# Patient Record
Sex: MALE | Race: WHITE | HISPANIC OR LATINO | Employment: FULL TIME | URBAN - METROPOLITAN AREA
[De-identification: names, ages, dates, MRNs, and addresses within clinical notes are randomized per-mention and may not be internally consistent; named-entity substitution may affect disease eponyms.]

---

## 2021-01-26 ENCOUNTER — APPOINTMENT (OUTPATIENT)
Dept: RADIOLOGY | Facility: MEDICAL CENTER | Age: 31
End: 2021-01-26
Attending: EMERGENCY MEDICINE
Payer: COMMERCIAL

## 2021-01-26 ENCOUNTER — HOSPITAL ENCOUNTER (EMERGENCY)
Facility: MEDICAL CENTER | Age: 31
End: 2021-01-26
Attending: EMERGENCY MEDICINE
Payer: COMMERCIAL

## 2021-01-26 ENCOUNTER — NURSE TRIAGE (OUTPATIENT)
Dept: HEALTH INFORMATION MANAGEMENT | Facility: OTHER | Age: 31
End: 2021-01-26

## 2021-01-26 VITALS
OXYGEN SATURATION: 96 % | DIASTOLIC BLOOD PRESSURE: 67 MMHG | SYSTOLIC BLOOD PRESSURE: 101 MMHG | BODY MASS INDEX: 22.81 KG/M2 | RESPIRATION RATE: 18 BRPM | TEMPERATURE: 97.6 F | HEART RATE: 82 BPM | WEIGHT: 154 LBS | HEIGHT: 69 IN

## 2021-01-26 DIAGNOSIS — R07.81 PLEURITIC CHEST PAIN: ICD-10-CM

## 2021-01-26 DIAGNOSIS — U07.1 PNEUMONIA DUE TO COVID-19 VIRUS: ICD-10-CM

## 2021-01-26 DIAGNOSIS — R04.2 HEMOPTYSIS: ICD-10-CM

## 2021-01-26 DIAGNOSIS — J12.82 PNEUMONIA DUE TO COVID-19 VIRUS: ICD-10-CM

## 2021-01-26 LAB
ALBUMIN SERPL BCP-MCNC: 3.9 G/DL (ref 3.2–4.9)
ALBUMIN/GLOB SERPL: 1.2 G/DL
ALP SERPL-CCNC: 111 U/L (ref 30–99)
ALT SERPL-CCNC: 169 U/L (ref 2–50)
ANION GAP SERPL CALC-SCNC: 12 MMOL/L (ref 7–16)
AST SERPL-CCNC: 77 U/L (ref 12–45)
BASOPHILS # BLD AUTO: 0.1 % (ref 0–1.8)
BASOPHILS # BLD: 0.01 K/UL (ref 0–0.12)
BILIRUB SERPL-MCNC: 0.8 MG/DL (ref 0.1–1.5)
BUN SERPL-MCNC: 10 MG/DL (ref 8–22)
CALCIUM SERPL-MCNC: 9.2 MG/DL (ref 8.5–10.5)
CHLORIDE SERPL-SCNC: 104 MMOL/L (ref 96–112)
CO2 SERPL-SCNC: 22 MMOL/L (ref 20–33)
CREAT SERPL-MCNC: 1.08 MG/DL (ref 0.5–1.4)
EOSINOPHIL # BLD AUTO: 0.03 K/UL (ref 0–0.51)
EOSINOPHIL NFR BLD: 0.3 % (ref 0–6.9)
ERYTHROCYTE [DISTWIDTH] IN BLOOD BY AUTOMATED COUNT: 35.5 FL (ref 35.9–50)
GLOBULIN SER CALC-MCNC: 3.2 G/DL (ref 1.9–3.5)
GLUCOSE SERPL-MCNC: 95 MG/DL (ref 65–99)
HCT VFR BLD AUTO: 42.8 % (ref 42–52)
HGB BLD-MCNC: 15.2 G/DL (ref 14–18)
IMM GRANULOCYTES # BLD AUTO: 0.13 K/UL (ref 0–0.11)
IMM GRANULOCYTES NFR BLD AUTO: 1.2 % (ref 0–0.9)
LYMPHOCYTES # BLD AUTO: 1.15 K/UL (ref 1–4.8)
LYMPHOCYTES NFR BLD: 11 % (ref 22–41)
MCH RBC QN AUTO: 29.9 PG (ref 27–33)
MCHC RBC AUTO-ENTMCNC: 35.5 G/DL (ref 33.7–35.3)
MCV RBC AUTO: 84.3 FL (ref 81.4–97.8)
MONOCYTES # BLD AUTO: 0.89 K/UL (ref 0–0.85)
MONOCYTES NFR BLD AUTO: 8.5 % (ref 0–13.4)
NEUTROPHILS # BLD AUTO: 8.25 K/UL (ref 1.82–7.42)
NEUTROPHILS NFR BLD: 78.9 % (ref 44–72)
NRBC # BLD AUTO: 0 K/UL
NRBC BLD-RTO: 0 /100 WBC
PLATELET # BLD AUTO: 361 K/UL (ref 164–446)
PMV BLD AUTO: 9.7 FL (ref 9–12.9)
POTASSIUM SERPL-SCNC: 3.5 MMOL/L (ref 3.6–5.5)
PROCALCITONIN SERPL-MCNC: 0.06 NG/ML
PROT SERPL-MCNC: 7.1 G/DL (ref 6–8.2)
RBC # BLD AUTO: 5.08 M/UL (ref 4.7–6.1)
SODIUM SERPL-SCNC: 138 MMOL/L (ref 135–145)
WBC # BLD AUTO: 10.5 K/UL (ref 4.8–10.8)

## 2021-01-26 PROCEDURE — 36415 COLL VENOUS BLD VENIPUNCTURE: CPT | Mod: EDC

## 2021-01-26 PROCEDURE — 84145 PROCALCITONIN (PCT): CPT

## 2021-01-26 PROCEDURE — 85025 COMPLETE CBC W/AUTO DIFF WBC: CPT

## 2021-01-26 PROCEDURE — 80053 COMPREHEN METABOLIC PANEL: CPT

## 2021-01-26 PROCEDURE — 700117 HCHG RX CONTRAST REV CODE 255: Performed by: EMERGENCY MEDICINE

## 2021-01-26 PROCEDURE — 71275 CT ANGIOGRAPHY CHEST: CPT

## 2021-01-26 PROCEDURE — 99284 EMERGENCY DEPT VISIT MOD MDM: CPT | Mod: EDC

## 2021-01-26 RX ORDER — TRAMADOL HYDROCHLORIDE 50 MG/1
50-100 TABLET ORAL EVERY 6 HOURS PRN
Qty: 16 TAB | Refills: 0 | Status: SHIPPED | OUTPATIENT
Start: 2021-01-26 | End: 2021-01-31

## 2021-01-26 RX ADMIN — IOHEXOL 60 ML: 350 INJECTION, SOLUTION INTRAVENOUS at 13:03

## 2021-01-26 SDOH — HEALTH STABILITY: MENTAL HEALTH: HOW OFTEN DO YOU HAVE A DRINK CONTAINING ALCOHOL?: 2-4 TIMES A MONTH

## 2021-01-26 SDOH — HEALTH STABILITY: MENTAL HEALTH: HOW OFTEN DO YOU HAVE 6 OR MORE DRINKS ON ONE OCCASION?: NEVER

## 2021-01-26 SDOH — HEALTH STABILITY: MENTAL HEALTH: HOW MANY STANDARD DRINKS CONTAINING ALCOHOL DO YOU HAVE ON A TYPICAL DAY?: 1 OR 2

## 2021-01-26 NOTE — ED NOTES
1215 - RN rounded on pt who is waiting for CT scan. He denies any current needs.   1255 - Pt to CT.  1301 - Pt returned from CT now.

## 2021-01-26 NOTE — TELEPHONE ENCOUNTER
"Coughing up blood for 3 days.  Pain in LEFT mid back pain  Taking Tussin DM for cough     Hx of COVID 19  Symptom onset 01/10/21 with fever  Tested positive 01/12/21    No PCP, Advised to go to ED.      Reason for Disposition  • [1] Coughed up blood AND [2] > 1 tablespoon (15 ml) (Exception: blood-tinged sputum)    Additional Information  • Negative: Severe difficulty breathing (e.g., struggling for each breath, speaks in single words)  • Negative: [1] Chest pain AND [2] difficulty breathing  • Negative: Bluish (or gray) lips or face now  • Negative: Passed out (i.e., lost consciousness, collapsed and was not responding)  • Negative: Shock suspected (e.g., cold/pale/clammy skin, too weak to stand, low BP, rapid pulse)  • Negative: Difficult to awaken or acting confused (e.g., disoriented, slurred speech)  • Negative: Recent chest injury (i.e., past 24 hours)  • Negative: [1] Coughed up blood AND [2] large amount (example: \"a cup of blood\")  • Negative: Sounds like a life-threatening emergency to the triager  • Negative: Difficulty breathing  • Negative: Chest pain  • Negative: Unclear to triager if the patient is coughing up blood or vomiting blood  • Negative: History of prior \"blood clot\" in leg or lungs (i.e., deep vein thrombosis, pulmonary embolism)  • Negative: History of inherited increased risk of blood clots (e.g., Factor 5 Leiden, Anti-thrombin 3, Protein C or Protein S deficiency, Prothrombin mutation)  • Negative: Pregnant or pregnant in past month  • Negative: Hip or leg fracture in past 2 months (e.g., or had cast on leg or ankle)  • Negative: Prolonged travel within the last month  (e.g., long bus trip or plane trip)  • Negative: Bedridden (e.g., nursing home patient, CVA, chronic illness, recovering from surgery)  • Negative: Patient sounds very sick or weak to the triager    Answer Assessment - Initial Assessment Questions  1. ONSET: \"When did you start coughing up blood?\"      3 days  2. SEVERITY: " "\"How many times?\" \"How much blood?\" (e.g., flecks, streaks, tablespoons, etc)     10 times, really bloody  3. COUGHING SPASMS: \"Did the blood appear after a coughing spell?\"        No  4. RESPIRATORY DISTRESS: \"Describe your breathing.\"       Hard time breathing because of back of pain  5. FEVER: \"Do you have a fever?\" If so, ask: \"What is your temperature, how was it measured, and when did it start?\"      No  6. SPUTUM: \"Describe the color of your sputum\" (clear, white, yellow, green), \"Has there been any change recently?\"      Bloody  7. CARDIAC HISTORY: \"Do you have any history of heart disease?\" (e.g., heart attack, congestive heart failure)       No  8. LUNG HISTORY: \"Do you have any history of lung disease?\"  (e.g., pulmonary embolus, asthma, emphysema)      No  9. PE RISK FACTORS: \"Do you have a history of blood clots?\" (or: recent major surgery, recent prolonged travel, bedridden)     Tonsillectomy 4 years ago  10. OTHER SYMPTOMS: \"Do you have any other symptoms?\" (e.g., nosebleed, chest pain, abdominal pain, vomiting)        No  11. PREGNANCY: \"Is there any chance you are pregnant?\" \"When was your last menstrual period?\"        n/a  12. TRAVEL: \"Have you traveled out of the country in the last month?\" (e.g., travel history, exposures)        No    Protocols used: COUGHING UP BLOOD-A-AH      "

## 2021-01-26 NOTE — ED NOTES
VSS w/ in last 15 minutes. DC instructions, prescriptions for Ultram electronically sent, & FU care explained to pt who verbalized understanding. DC'd home.

## 2021-01-26 NOTE — ED TRIAGE NOTES
Chief Complaint   Patient presents with   • Blood in Sputum     States he has been coughing up bright red blood X 3 days.  Tested positive for covid-19 on 1-12-21.  covid symptoms began 1/10/21.  Triage process explained to patient.  Pt instructed to inform RN if any changes or questions arise.  Pt to senior lounge.

## 2021-01-26 NOTE — ED NOTES
Introduction to pt. History obtained. Pt assessment completed, gown to pt. Call light within reach, no additional needs at this time.

## 2021-01-26 NOTE — ED PROVIDER NOTES
ED Provider Note    CHIEF COMPLAINT  Chief Complaint   Patient presents with   • Blood in Sputum       HPI  Sixto Saucedo is a 30 y.o. male who presents with left posterior flank and thoracic pain worse with deep breath.  He states onset of pain earlier this morning, with associated worsening cough.  Patient coughed up bloody sputum twice at least.  He denies history of DVT or pulmonary embolism.  He was diagnosed with COVID-19 on January 12, symptoms have started on January 10.  Activity worsens cough.  No abdominal pain, no vomiting.  No bloody emesis or stool, no hematuria.  Patient does not take blood thinners.  Patient with persistent slight cough during the evaluation    REVIEW OF SYSTEMS    Constitutional: No fever or dizziness  Respiratory: Cough, shortness of breath, hemoptysis, pleuritic chest pain  Cardiac: No syncope  Gastrointestinal: No abdominal pain, no vomiting  Musculoskeletal: Left-sided thoracic pain  Neurologic: Denies headache       All other systems are negative.       PAST MEDICAL HISTORY  History reviewed. No pertinent past medical history.    FAMILY HISTORY  History reviewed. No pertinent family history.    SOCIAL HISTORY  Social History     Socioeconomic History   • Marital status: Not on file     Spouse name: Not on file   • Number of children: Not on file   • Years of education: Not on file   • Highest education level: Not on file   Occupational History   • Not on file   Social Needs   • Financial resource strain: Not on file   • Food insecurity     Worry: Not on file     Inability: Not on file   • Transportation needs     Medical: Not on file     Non-medical: Not on file   Tobacco Use   • Smoking status: Never Smoker   • Smokeless tobacco: Never Used   Substance and Sexual Activity   • Alcohol use: Yes     Frequency: 2-4 times a month     Drinks per session: 1 or 2     Binge frequency: Never   • Drug use: Never   • Sexual activity: Not on file   Lifestyle   • Physical activity     Days  "per week: Not on file     Minutes per session: Not on file   • Stress: Not on file   Relationships   • Social connections     Talks on phone: Not on file     Gets together: Not on file     Attends Yarsanism service: Not on file     Active member of club or organization: Not on file     Attends meetings of clubs or organizations: Not on file     Relationship status: Not on file   • Intimate partner violence     Fear of current or ex partner: Not on file     Emotionally abused: Not on file     Physically abused: Not on file     Forced sexual activity: Not on file   Other Topics Concern   • Not on file   Social History Narrative   • Not on file       SURGICAL HISTORY  History reviewed. No pertinent surgical history.    CURRENT MEDICATIONS  Home Medications     Reviewed by John Fernández R.N. (Registered Nurse) on 01/26/21 at 1025  Med List Status: Complete   Medication Last Dose Status        Patient Jigar Taking any Medications                       ALLERGIES  No Known Allergies    PHYSICAL EXAM  VITAL SIGNS: BP (!) 98/59   Pulse 99   Temp 36.3 °C (97.3 °F) (Temporal)   Resp 18   Ht 1.753 m (5' 9\")   Wt 69.9 kg (154 lb)   SpO2 96%   BMI 22.74 kg/m²   Constitutional:  Non-toxic appearance.   HENT: No facial swelling  Eyes: Anicteric, no conjunctivitis.     Cardiovascular: Normal heart rate, Normal rhythm  Pulmonary:  No wheezing with good air movement bilateral.  There are crackles at left lateral and left posterior lower thoracic region at site of pain.  Gastrointestinal: Soft, No tenderness, No masses  Skin: Warm, Dry, No cyanosis.  No bruising  Neurologic: Speech is clear, follows commands, facial expression is symmetrical.  Psychiatric: Affect normal,  Mood normal.  Patient is calm and cooperative  Musculoskeletal: Ribs are nontender.  No spinal tenderness.  No flank tenderness.    EKG/Labs  Results for orders placed or performed during the hospital encounter of 01/26/21   CBC WITH DIFFERENTIAL   Result " Value Ref Range    WBC 10.5 4.8 - 10.8 K/uL    RBC 5.08 4.70 - 6.10 M/uL    Hemoglobin 15.2 14.0 - 18.0 g/dL    Hematocrit 42.8 42.0 - 52.0 %    MCV 84.3 81.4 - 97.8 fL    MCH 29.9 27.0 - 33.0 pg    MCHC 35.5 (H) 33.7 - 35.3 g/dL    RDW 35.5 (L) 35.9 - 50.0 fL    Platelet Count 361 164 - 446 K/uL    MPV 9.7 9.0 - 12.9 fL    Neutrophils-Polys 78.90 (H) 44.00 - 72.00 %    Lymphocytes 11.00 (L) 22.00 - 41.00 %    Monocytes 8.50 0.00 - 13.40 %    Eosinophils 0.30 0.00 - 6.90 %    Basophils 0.10 0.00 - 1.80 %    Immature Granulocytes 1.20 (H) 0.00 - 0.90 %    Nucleated RBC 0.00 /100 WBC    Neutrophils (Absolute) 8.25 (H) 1.82 - 7.42 K/uL    Lymphs (Absolute) 1.15 1.00 - 4.80 K/uL    Monos (Absolute) 0.89 (H) 0.00 - 0.85 K/uL    Eos (Absolute) 0.03 0.00 - 0.51 K/uL    Baso (Absolute) 0.01 0.00 - 0.12 K/uL    Immature Granulocytes (abs) 0.13 (H) 0.00 - 0.11 K/uL    NRBC (Absolute) 0.00 K/uL   COMP METABOLIC PANEL   Result Value Ref Range    Sodium 138 135 - 145 mmol/L    Potassium 3.5 (L) 3.6 - 5.5 mmol/L    Chloride 104 96 - 112 mmol/L    Co2 22 20 - 33 mmol/L    Anion Gap 12.0 7.0 - 16.0    Glucose 95 65 - 99 mg/dL    Bun 10 8 - 22 mg/dL    Creatinine 1.08 0.50 - 1.40 mg/dL    Calcium 9.2 8.5 - 10.5 mg/dL    AST(SGOT) 77 (H) 12 - 45 U/L    ALT(SGPT) 169 (H) 2 - 50 U/L    Alkaline Phosphatase 111 (H) 30 - 99 U/L    Total Bilirubin 0.8 0.1 - 1.5 mg/dL    Albumin 3.9 3.2 - 4.9 g/dL    Total Protein 7.1 6.0 - 8.2 g/dL    Globulin 3.2 1.9 - 3.5 g/dL    A-G Ratio 1.2 g/dL   Procalcitonin   Result Value Ref Range    Procalcitonin 0.06 <0.25 ng/mL   ESTIMATED GFR   Result Value Ref Range    GFR If African American >60 >60 mL/min/1.73 m 2    GFR If Non African American >60 >60 mL/min/1.73 m 2         RADIOLOGY/PROCEDURES  CT-CTA CHEST PULMONARY ARTERY W/ RECONS   Final Result      1.  No pulmonary embolus.   2.  Peripheral consolidative opacities in both lungs, predominantly affecting the lower lobes, sequela of active or prior  pneumonia.   3.  Mild mediastinal lymphadenopathy, most likely reactive.            COURSE & MEDICAL DECISION MAKING  Pertinent Labs & Imaging studies reviewed. (See chart for details)  Patient with focal area of crackles, hemoptysis, pleuritic chest pain, recent COVID-19 infection concerning for bacterial pneumonia as a secondary infection, pulmonary embolism, or worsening COVID-19.  His pulse oximetry remains normal, most recently 96%.  CT scan of the chest negative for pulmonary embolism.  Bilateral consolidative opacities likely COVID-19 pneumonia.  His procalcitonin level is normal, less likely to be bacterial.  I discussed return precautions, patient is agreeable to returning.  He is prescribed tramadol for pain control after discussion.  Etiology of his hemoptysis is unknown, appears to have resolved as he has been complaining of a dry cough throughout his stay in the emergency department.    FINAL IMPRESSION     1. Hemoptysis     2. Pleuritic chest pain  traMADol (ULTRAM) 50 MG Tab   3. Pneumonia due to COVID-19 virus                     Electronically signed by: Iraj Pacheco M.D., 1/26/2021 11:01 AM

## 2021-04-22 ENCOUNTER — TELEPHONE (OUTPATIENT)
Dept: SCHEDULING | Facility: IMAGING CENTER | Age: 31
End: 2021-04-22

## 2021-05-11 SDOH — HEALTH STABILITY: PHYSICAL HEALTH: ON AVERAGE, HOW MANY MINUTES DO YOU ENGAGE IN EXERCISE AT THIS LEVEL?: 0 MINUTES

## 2021-05-11 SDOH — HEALTH STABILITY: PHYSICAL HEALTH: ON AVERAGE, HOW MANY DAYS PER WEEK DO YOU ENGAGE IN MODERATE TO STRENUOUS EXERCISE (LIKE A BRISK WALK)?: 0 DAYS

## 2021-05-11 SDOH — ECONOMIC STABILITY: HOUSING INSECURITY
IN THE LAST 12 MONTHS, WAS THERE A TIME WHEN YOU DID NOT HAVE A STEADY PLACE TO SLEEP OR SLEPT IN A SHELTER (INCLUDING NOW)?: NO

## 2021-05-11 SDOH — ECONOMIC STABILITY: HOUSING INSECURITY: IN THE LAST 12 MONTHS, HOW MANY PLACES HAVE YOU LIVED?: 3

## 2021-05-11 SDOH — ECONOMIC STABILITY: INCOME INSECURITY: IN THE LAST 12 MONTHS, WAS THERE A TIME WHEN YOU WERE NOT ABLE TO PAY THE MORTGAGE OR RENT ON TIME?: NO

## 2021-05-11 SDOH — HEALTH STABILITY: MENTAL HEALTH
STRESS IS WHEN SOMEONE FEELS TENSE, NERVOUS, ANXIOUS, OR CAN'T SLEEP AT NIGHT BECAUSE THEIR MIND IS TROUBLED. HOW STRESSED ARE YOU?: NOT AT ALL

## 2021-05-11 SDOH — ECONOMIC STABILITY: TRANSPORTATION INSECURITY
IN THE PAST 12 MONTHS, HAS THE LACK OF TRANSPORTATION KEPT YOU FROM MEDICAL APPOINTMENTS OR FROM GETTING MEDICATIONS?: NO

## 2021-05-11 SDOH — ECONOMIC STABILITY: TRANSPORTATION INSECURITY
IN THE PAST 12 MONTHS, HAS LACK OF RELIABLE TRANSPORTATION KEPT YOU FROM MEDICAL APPOINTMENTS, MEETINGS, WORK OR FROM GETTING THINGS NEEDED FOR DAILY LIVING?: NO

## 2021-05-11 ASSESSMENT — SOCIAL DETERMINANTS OF HEALTH (SDOH)
HOW MANY DRINKS CONTAINING ALCOHOL DO YOU HAVE ON A TYPICAL DAY WHEN YOU ARE DRINKING: 3 OR 4
HOW OFTEN DO YOU HAVE A DRINK CONTAINING ALCOHOL: 4 OR MORE TIMES A WEEK
WITHIN THE PAST 12 MONTHS, THE FOOD YOU BOUGHT JUST DIDN'T LAST AND YOU DIDN'T HAVE MONEY TO GET MORE: NEVER TRUE
HOW OFTEN DO YOU ATTEND CHURCH OR RELIGIOUS SERVICES?: NEVER
HOW OFTEN DO YOU HAVE SIX OR MORE DRINKS ON ONE OCCASION: LESS THAN MONTHLY
WITHIN THE PAST 12 MONTHS, YOU WORRIED THAT YOUR FOOD WOULD RUN OUT BEFORE YOU GOT THE MONEY TO BUY MORE: NEVER TRUE
ARE YOU MARRIED, WIDOWED, DIVORCED, SEPARATED, NEVER MARRIED, OR LIVING WITH A PARTNER?: NEVER MARRIED
HOW OFTEN DO YOU ATTENT MEETINGS OF THE CLUB OR ORGANIZATION YOU BELONG TO?: DECLINE
DO YOU BELONG TO ANY CLUBS OR ORGANIZATIONS SUCH AS CHURCH GROUPS UNIONS, FRATERNAL OR ATHLETIC GROUPS, OR SCHOOL GROUPS?: NO
HOW OFTEN DO YOU GET TOGETHER WITH FRIENDS OR RELATIVES?: ONCE A WEEK
IN A TYPICAL WEEK, HOW MANY TIMES DO YOU TALK ON THE PHONE WITH FAMILY, FRIENDS, OR NEIGHBORS?: TWICE A WEEK
HOW HARD IS IT FOR YOU TO PAY FOR THE VERY BASICS LIKE FOOD, HOUSING, MEDICAL CARE, AND HEATING?: NOT HARD AT ALL

## 2021-05-12 ENCOUNTER — OFFICE VISIT (OUTPATIENT)
Dept: MEDICAL GROUP | Facility: PHYSICIAN GROUP | Age: 31
End: 2021-05-12
Payer: COMMERCIAL

## 2021-05-12 VITALS
SYSTOLIC BLOOD PRESSURE: 114 MMHG | BODY MASS INDEX: 25.8 KG/M2 | HEIGHT: 69 IN | RESPIRATION RATE: 12 BRPM | OXYGEN SATURATION: 96 % | WEIGHT: 174.2 LBS | DIASTOLIC BLOOD PRESSURE: 56 MMHG | TEMPERATURE: 97.7 F | HEART RATE: 62 BPM

## 2021-05-12 DIAGNOSIS — Z87.01 HISTORY OF PNEUMONIA: ICD-10-CM

## 2021-05-12 DIAGNOSIS — H53.10: ICD-10-CM

## 2021-05-12 DIAGNOSIS — R07.81 PLEURITIC CHEST PAIN: ICD-10-CM

## 2021-05-12 DIAGNOSIS — Z11.3 ROUTINE SCREENING FOR STI (SEXUALLY TRANSMITTED INFECTION): ICD-10-CM

## 2021-05-12 PROCEDURE — 99204 OFFICE O/P NEW MOD 45 MIN: CPT | Performed by: PHYSICIAN ASSISTANT

## 2021-05-12 ASSESSMENT — FIBROSIS 4 INDEX: FIB4 SCORE: 0.51

## 2021-05-12 ASSESSMENT — PATIENT HEALTH QUESTIONNAIRE - PHQ9: CLINICAL INTERPRETATION OF PHQ2 SCORE: 0

## 2021-05-12 NOTE — PROGRESS NOTES
Chief Complaint   Patient presents with   • Establish Care   • Pain     In left lung, and rt eye        HISTORY OF THE PRESENT ILLNESS: Sixto Saucedo is a 31 y.o. male new patient to our practice. This pleasant patient is here today to establish care and to discuss the evaluation and management of:    Patient is a pleasant 31-year-old male here today to establish care and discuss left lower lung pain and right eye fatigue.  Patient states he was diagnosed with COVID-19 on 1/12/2021 and then on 1/26/2021 he started experiencing left posterior flank pain and thoracic pain symptoms that worsen with deep breaths.  He was seen at [facility] emergency room and diagnosed with paretic chest pain and pneumonia due to COVID-19 virus.  During hospitalization pulmonary embolism was ruled out.  Patient was not diagnosed with bacterial pneumonia and was not prescribed antibiotic.  He was given tramadol for acute use.  He tells me since hospitalization pleuritic chest pain symptoms have improved but if he experiences a deep cough or sneeze he experiences a burning spasming sensation of left posterior flank/thoracic region.  He tells me intermittently throughout the day he will experience a mild burning sensation of this region.  No known triggers.  He denies wheezing, shortness of breath, hemoptysis, chronic cough, productive cough, fever, chills, nausea, vomiting, tachycardia, tachypnea, night sweats, unintentional weight loss, abdominal pain, melena, hematochezia.  Area of concern is nontender to palpation.  Denies using over-the-counter or at home treatments alleviate symptoms.    Patient states in September 2020 started developing right eye fatigue.  States occasionally he experiences a deep mild low-grade aching pain behind right eyebrow.  Patient states on average he has 12 hours of screen time per day.  He feels the day progresses I fatigue symptoms worsen.  He has noticed since he stopped drinking coffee and is getting more  rest symptoms have improved.  On average sleeps 6-8 hours per night.  He denies vision changes but states he occasionally experiences blurry vision with pain symptoms.  He is unsure if experiencing blurry vision in both eyes or if blurriness is unilateral.  States his right eye does occasionally feel dry.  He plans on seeing an optometrist in the near future for an eye exam.  He denies severe headache, nausea, vomiting, dizziness, syncope, neurological deficits.          History reviewed. No pertinent past medical history.    Past Surgical History:   Procedure Laterality Date   • TONSILLECTOMY         Family Status   Relation Name Status   • Mo  Alive   • Fa  Alive   • Sis x3 Alive   • Bro x4 Alive     Family History   Problem Relation Age of Onset   • Hypertension Mother    • Blood Disease Mother         blood clots    • Cancer Father         skin Ca       Social History     Tobacco Use   • Smoking status: Never Smoker   • Smokeless tobacco: Never Used   Vaping Use   • Vaping Use: Never used   Substance Use Topics   • Alcohol use: Yes     Alcohol/week: 1.2 oz     Types: 1 Glasses of wine, 1 Cans of beer per week     Comment: socially.    • Drug use: Never       Allergies: Patient has no known allergies.    No current [facility]-ordered outpatient medications on file.     No current [facility]-ordered facility-administered medications on file.       Review of Systems   Constitutional: Negative for fever, chills, weight loss and malaise/fatigue.   HENT: Negative for ear pain, nosebleeds, congestion, sore throat and neck pain.    Eyes: Negative for blurred vision.   Respiratory: Negative for cough, sputum production, shortness of breath and wheezing.    Cardiovascular: Negative for palpitations, orthopnea and leg swelling.   Gastrointestinal: Negative for heartburn, nausea, vomiting and abdominal pain.   Genitourinary: Negative for dysuria, urgency and frequency.   Musculoskeletal: Negative for myalgias, back pain and joint pain.  "  Skin: Negative for rash and itching.   Neurological: Negative for dizziness, tingling, tremors, sensory change, focal weakness and headaches.   Endo/Heme/Allergies: Does not bruise/bleed easily.   Psychiatric/Behavioral: Negative for depression, anxiety, or memory loss.     All other systems reviewed and are negative except as in HPI.    Exam: /56 (BP Location: Left arm, Patient Position: Sitting, BP Cuff Size: Adult)   Pulse 62   Temp 36.5 °C (97.7 °F) (Temporal)   Resp 12   Ht 1.746 m (5' 8.75\")   Wt 79 kg (174 lb 3.2 oz)   SpO2 96%  Body mass index is 25.91 kg/m².  General: Normal appearing. No distress.  HEENT: Normocephalic. Eyes conjunctiva clear lids without ptosis, pupils equal and reactive to light accommodation, ears normal shape and contour.  Neck: Supple without JVD. Thyroid is not enlarged.  Pulmonary: Clear to ausculation.  Normal effort. No rales, ronchi, or wheezing.  Cardiovascular: Regular rate and rhythm without murmur.   Abdomen: Nondistended.   Neurologic: Grossly nonfocal  Skin: Warm and dry.  No obvious lesions.  Musculoskeletal: Normal gait. No extremity cyanosis, clubbing, or edema.  Psych: Normal mood and affect. Alert and oriented x3. Judgment and insight is normal.      Medical decision-making and discussion:  1. History of pneumonia  2. Pleuritic chest pain  Chest x-ray has been ordered to further evaluate patient.  Patient be contacted with results.  Advised patient to do breathing exercises.  Discussed pleuritic chest pain with patient.  Discussed ED precautions.    Follow-up for worsening symptoms,lack of expected recovery, or should new symptoms or problems arise.      - DX-CHEST-2 VIEWS; Future    3.  Eye fatigue, right  Advised patient to follow-up with an optometrist for complete eye exam.  Suggested patient decrease screen time and wear bluelight lenses.  Continue decreasing caffeine consumption, staying hydrated, and practicing good sleep hygiene.  Suggested taking " over-the-counter analgesics for headache symptoms.      4. Routine screening for STI (sexually transmitted infection)  Routine screening for STIs have been ordered.  Patient has more than 1 partner but practices safe sex.    - HIV AG/AB COMBO ASSAY SCREENING; Future  - Chlamydia/GC PCR Urine Or Swab; Future  - RPR (SYPHILIS); Future  - HEP C VIRUS ANTIBODY; Future  - HEP B SURFACE ANTIGEN; Future      Please note that this dictation was created using voice recognition software. I have made every reasonable attempt to correct obvious errors, but I expect that there are errors of grammar and possibly content that I did not discover before finalizing the note.      Assessment/Plan  1. History of pneumonia  DX-CHEST-2 VIEWS   2. Pleuritic chest pain  DX-CHEST-2 VIEWS   3. Eye fatigue, right     4. Routine screening for STI (sexually transmitted infection)  HIV AG/AB COMBO ASSAY SCREENING    Chlamydia/GC PCR Urine Or Swab    RPR (SYPHILIS)    HEP C VIRUS ANTIBODY    HEP B SURFACE ANTIGEN       Return if symptoms worsen or fail to improve.

## 2021-06-02 ENCOUNTER — APPOINTMENT (OUTPATIENT)
Dept: RADIOLOGY | Facility: MEDICAL CENTER | Age: 31
End: 2021-06-02
Attending: PHYSICIAN ASSISTANT
Payer: COMMERCIAL